# Patient Record
(demographics unavailable — no encounter records)

---

## 2025-03-12 NOTE — CARDIOLOGY SUMMARY
[de-identified] : 3/12/2025: NSR (HR 68 bpm), LAD 6/12/2024: NSR (HR 65 bpm), LAD 7/12/2023: NSR (HR 64 bpm), LAD,  msec 3/29/2023 NSR (HR 60 bpm), QTc 433 msec 9/21/2022 AFib with RVR ( bpm), QTc 419 msec 8/10/2022 AFib with RVR terminating into NSR without a conversion pause 2/2/22 NSR (HR 72 bpm) [de-identified] : 8/10-9/8/2022 28 days 7% AF burden with RVR (HR up to 233 bpm); 1% PVC burden; 10 beats NSVT [de-identified] : 4/13/2023  Pharmacologic Stress  Normal LV myocardial perfusion. No CAD. Mildly reduced global LV stress function. Reduced EF is likely due to atrial fibrillation and gating error.  [de-identified] : 2/20/2024 EF 62% Mod AS. Mild dilated ascending aorta (3.8 cm). Normal LA size. Mild MR.   HERSON 8/6/21  1. Left ventricular ejection fraction, by visual estimation, is 55 to 60%.  2. Mild mitral valve regurgitation.  3. Mild tricuspid regurgitation.  4. Sclerotic aortic valve with normal opening.  5. Color flow doppler and intravenous injection of agitated saline demonstrates the presence of an intact intra atrial septum.  6. No left atrial appendage thrombus and normal left atrial appendage velocities.  [de-identified] : 6/13/2023 Cryo AF ablation

## 2025-03-12 NOTE — HISTORY OF PRESENT ILLNESS
[FreeTextEntry1] : Cardiologist: Dr. River PCP: Dr. Wisdom  65 yo F with history of HTN, hypothyroidism, left breast ca s/p lumpectomy, admitted 8/6-8/10/21 for pneumonia found to have new onset AFib with RVR. She underwent HERSON/CV, which was unsuccessful so she was loaded with Amiodarone and started on Eliquis and discharged. Patient developed hives/rash so Eliquis was changed to Xarelto.  She underwent Cryo AFib ablation on June 13, 2023. Off of Amio since Dec 2023.   6/12/2024 She denies chest pain, dyspnea, palpitations, dizziness, lightheadedness, presyncope or syncope. Feels well.  3/12/2025 Here for routine follow up. Feels well. No cardiovascular complaints. Had asymptomatic AFib in the past.

## 2025-03-12 NOTE — DISCUSSION/SUMMARY
[FreeTextEntry1] : We had an extensive conversation regarding the nature of atrial fibrillation, including potential etiologies, underlying pathophysiology and natural history of the disease. In addition, the potential risk of thromboembolic events and assessment of that risk were discussed. I have emphasized the importance of continuing anticoagulation.   I have recommended an event monitor to further evaluate her symptoms to determine if the symptoms may be related to a cardiac arrhythmia.  I educated the patient about the patient symptom activator feature and I have asked her to activate the event monitor whenever she has symptoms.  [EKG obtained to assist in diagnosis and management of assessed problem(s)] : EKG obtained to assist in diagnosis and management of assessed problem(s)

## 2025-03-12 NOTE — ASSESSMENT
[FreeTextEntry1] : # Paroxysmal AFib on Xarelto s/p cryoballoon AF ablation 6/13/2023 - Prior to ablation, MCOT with 7% AF burden with RVR.  - Cont Xarelto 20 mg daily for CHADS VASc of at least 2, almost 3 (HTN, F). No signs/symptoms of bleeding.  - Cont Metoprolol Succinate ER 25 mg daily.  - Pulm referral provided multiple times but patient does not think she has sleep apnea.  - Event monitor to assess for AF recurrence since pt was asymptomatic prev  # NSVT - Stress test without CAD  # HTN - BP well controlled. - Cont Metoprolol Succinate ER 25 mg daily and Lisinopril 20 mg daily. - 2g Na diet enforced  I have also advised the patient to go to the nearest emergency room if she experiences any chest pain, dyspnea, syncope, or has any other compelling symptoms.  Follow up in 6 weeks

## 2025-04-03 NOTE — DATA REVIEWED
[FreeTextEntry1] : : [id number]     EXAM:  MG MAMMO SCREEN W JAKOB BI#     03/26/2025 INTERPRETATION:  HISTORY: Bilateral MG MAMMO SCREEN W JAKOB BI# was performed. Patient is 64 years old and is seen for screening.  The patient has a history of right excision in 2014 - benign and left lumpectomy in 2014 - malignant. The patient has the following family history of breast cancer:  maternal aunt, breast cancer and sister, at age 42, breast cancer.  RISK ASSESSMENT: Tyrer-Cuzick Lifetime Risk: 11.9  CLINICAL BREAST EXAM: The patient reports their last clinical breast exam was performed within the past year.  COMPARISON STUDIES: The present examination has been compared to prior imaging studies performed at [facility] on 02/17/2022, 02/23/2023 and 02/29/2024.  MAMMOGRAM FINDINGS: Mammography was performed including the following views: bilateral craniocaudal with tomosynthesis, bilateral mediolateral oblique with tomosynthesis, and bilateral nipple profile.  The examination includes digital synthetic 2D and digital tomosynthesis 3D images. Additional imaging analysis was performed using CAD (computer-aided detection) software.  There are scattered areas of fibroglandular density.  Finding 1:  There is an area of benign architectural distortion corresponding to the site of surgery seen in the left breast.  Finding 2:  There are benign-type calcifications seen in the left breast.  No suspicious mass, grouping of calcifications, or other abnormality is identified.  IMPRESSION: There is no mammographic evidence of malignancy.  RECOMMENDATION: Unless otherwise indicated by clinical findings, annual screening mammography recommended.  ASSESSMENT: BI-RADS Category 2:  Benign  The patient will be notified of these results by telephone, and will also be mailed a written summary in layman's terms.

## 2025-04-03 NOTE — HISTORY OF PRESENT ILLNESS
[FreeTextEntry1] : Problem LIst: 1. Left breast DCIS. ER/NE (+) Aide 8/6 (2014) 2. s/p Left NLOC / XOFT - 08/2014. 3. Dr Bashir - Tamoxifen since 2014 4. BRCA 1/2  negative   ASHWIN MCGOVERN is a 60 year old female patient who presents today in follow up for left breast DCIS.  INTERVAL HISTORY:  Ashwin is here for her yearly follow up for left breast DCIS (diagnosed and treated in 2014).   -s/p L IORT -s/p tamoxifen since 2014   She has been doing well with no new breast related complaints.  She denies any breast pain, has not palpated any new lesions, and denies any nipple discharge or retraction. She is continuing on tamoxifen and tolerating it well.  She denies any unilateral leg swelling, shortness of breath, abnormal vaginal bleeding or other health related complaints. She does have occasional bilateral calf pain however.   She has started TERMFIRE for psoriasis.   Her most recent imaging was a b/l screening tomosynthesis on 2/15/21 which revealed L architectural distortion at site of surgery which has remained stable, stable benign type calcifications in her left breast, deemed BIRADS 2.   03/29/2022 -- Ashwin is here for her yearly follow up visit for left breast DCIS (diagnosed and treated in 2014).   -s/p L IORT -s/p tamoxifen since 2014   She has no breast related complaints at this time.  She denies any breast pain, has not palpated any new palpable masses in either breast and denies any nipple discharge or retraction.  Her most recent imaging: B/L Screening Mammo - 02/17/2022: -There are scattered areas of fibroglandular density. -There are stable benign-type calcifications with associated benign architectural distortion corresponding to the site of surgery seen in the left breast. -There is no mammographic evidence of malignancy. BI-RADS Category 2:  Benign  03/30/2023 -- Ashwin is here for her yearly follow up visit for left breast DCIS (diagnosed and treated in 2014).   -s/p L IORT -s/p tamoxifen since 2014   She has no breast related complaints at this time.  She denies any breast pain, has not palpated any new palpable masses in either breast and denies any nipple discharge or retraction.  Her most recent imaging: B/L Screening Mammo - 02/23/2023: -There are scattered areas of fibroglandular density. -There is an area of architectural distortion at the site of lumpectomy seen in the left breast. -There is an area of benign architectural distortion corresponding to the site of surgery seen in the right breast. -There is no mammographic evidence of malignancy. BI-RADS Category 2:  Benign  03/08/2024 -- Ashwin is here for her yearly follow up visit for left breast DCIS (diagnosed and treated in 2014).   -s/p L IORT -s/p tamoxifen since 2014   She has no breast related complaints at this time.  She denies any breast pain, has not palpated any new palpable masses in either breast and denies any nipple discharge or retraction.  Her most recent imaging: B/L Screening Mammo - 02/29/2024: -There are scattered areas of fibroglandular density. -There is an area of benign architectural distortion corresponding to the site of surgery seen in the left breast. -There are benign-type calcifications seen in the left breast. -No suspicious mass, grouping of calcifications, or other abnormality is identified. -There is no mammographic evidence of malignancy. BI-RADS Category 2:  Benign   3/26/2025 b/l mammo-->birads2 scattered areas of fibroglandular density.  an area of benign architectural distortion corresponding to the site of surgery seen in the left breast.  benign-type calcifications seen in the left breast.  She has no breast related complaints at this time.  She denies any breast pain, has not palpated any new palpable masses in either breast and denies any nipple discharge or retraction.

## 2025-04-03 NOTE — HISTORY OF PRESENT ILLNESS
[FreeTextEntry1] : Problem LIst: 1. Left breast DCIS. ER/TN (+) Aide 8/6 (2014) 2. s/p Left NLOC / XOFT - 08/2014. 3. Dr Bashir - Tamoxifen since 2014 4. BRCA 1/2  negative   ASHWIN MCGOVERN is a 60 year old female patient who presents today in follow up for left breast DCIS.  INTERVAL HISTORY:  Ashwin is here for her yearly follow up for left breast DCIS (diagnosed and treated in 2014).   -s/p L IORT -s/p tamoxifen since 2014   She has been doing well with no new breast related complaints.  She denies any breast pain, has not palpated any new lesions, and denies any nipple discharge or retraction. She is continuing on tamoxifen and tolerating it well.  She denies any unilateral leg swelling, shortness of breath, abnormal vaginal bleeding or other health related complaints. She does have occasional bilateral calf pain however.   She has started TERMFIRE for psoriasis.   Her most recent imaging was a b/l screening tomosynthesis on 2/15/21 which revealed L architectural distortion at site of surgery which has remained stable, stable benign type calcifications in her left breast, deemed BIRADS 2.   03/29/2022 -- Ashwin is here for her yearly follow up visit for left breast DCIS (diagnosed and treated in 2014).   -s/p L IORT -s/p tamoxifen since 2014   She has no breast related complaints at this time.  She denies any breast pain, has not palpated any new palpable masses in either breast and denies any nipple discharge or retraction.  Her most recent imaging: B/L Screening Mammo - 02/17/2022: -There are scattered areas of fibroglandular density. -There are stable benign-type calcifications with associated benign architectural distortion corresponding to the site of surgery seen in the left breast. -There is no mammographic evidence of malignancy. BI-RADS Category 2:  Benign  03/30/2023 -- Ashwin is here for her yearly follow up visit for left breast DCIS (diagnosed and treated in 2014).   -s/p L IORT -s/p tamoxifen since 2014   She has no breast related complaints at this time.  She denies any breast pain, has not palpated any new palpable masses in either breast and denies any nipple discharge or retraction.  Her most recent imaging: B/L Screening Mammo - 02/23/2023: -There are scattered areas of fibroglandular density. -There is an area of architectural distortion at the site of lumpectomy seen in the left breast. -There is an area of benign architectural distortion corresponding to the site of surgery seen in the right breast. -There is no mammographic evidence of malignancy. BI-RADS Category 2:  Benign  03/08/2024 -- Ashwin is here for her yearly follow up visit for left breast DCIS (diagnosed and treated in 2014).   -s/p L IORT -s/p tamoxifen since 2014   She has no breast related complaints at this time.  She denies any breast pain, has not palpated any new palpable masses in either breast and denies any nipple discharge or retraction.  Her most recent imaging: B/L Screening Mammo - 02/29/2024: -There are scattered areas of fibroglandular density. -There is an area of benign architectural distortion corresponding to the site of surgery seen in the left breast. -There are benign-type calcifications seen in the left breast. -No suspicious mass, grouping of calcifications, or other abnormality is identified. -There is no mammographic evidence of malignancy. BI-RADS Category 2:  Benign   3/26/2025 b/l mammo-->birads2 scattered areas of fibroglandular density.  an area of benign architectural distortion corresponding to the site of surgery seen in the left breast.  benign-type calcifications seen in the left breast.  She has no breast related complaints at this time.  She denies any breast pain, has not palpated any new palpable masses in either breast and denies any nipple discharge or retraction.

## 2025-04-03 NOTE — ASSESSMENT
[FreeTextEntry1] : MARIE is a 64 year old patient who presented today in follow up for a history of left breast DCIS, ER/IA positive, 2014.   S/p L lumpectomy with IORT 2014 s/p tamoxifen since 2014.   On physical exam, there are no discrete masses in either breast or axilla. There is no nipple discharge or inversion bilaterally. There are no skin changes bilaterally.    3/26/2025 b/l mammo-->birads2 scattered areas of fibroglandular density.  an area of benign architectural distortion corresponding to the site of surgery seen in the left breast.  benign-type calcifications seen in the left breast.  She will be due for a bilateral screening mammogram in March 2026.  This will be scheduled for her today.   I will have her follow up in 1 year.    I spent a total of 30 minutes of face to face time with this patient, greater than 50% of which was spent in counseling and/or coordination of care. All of her questions were appropriately answered. She knows to call with any concerns.   PLAN: - B/L Screening Mammo - March 2026. - f/u after.

## 2025-04-03 NOTE — PAST MEDICAL HISTORY
[Postmenopausal] : The patient is postmenopausal [Menarche Age ____] : age at menarche was [unfilled] [Menopause Age____] : age at menopause was [unfilled] [Total Preg ___] : G[unfilled] [Live Births ___] : P[unfilled]  [FreeTextEntry6] : No. [FreeTextEntry7] : No.

## 2025-04-03 NOTE — ASSESSMENT
[FreeTextEntry1] : MARIE is a 64 year old patient who presented today in follow up for a history of left breast DCIS, ER/KS positive, 2014.   S/p L lumpectomy with IORT 2014 s/p tamoxifen since 2014.   On physical exam, there are no discrete masses in either breast or axilla. There is no nipple discharge or inversion bilaterally. There are no skin changes bilaterally.    3/26/2025 b/l mammo-->birads2 scattered areas of fibroglandular density.  an area of benign architectural distortion corresponding to the site of surgery seen in the left breast.  benign-type calcifications seen in the left breast.  She will be due for a bilateral screening mammogram in March 2026.  This will be scheduled for her today.   I will have her follow up in 1 year.    I spent a total of 30 minutes of face to face time with this patient, greater than 50% of which was spent in counseling and/or coordination of care. All of her questions were appropriately answered. She knows to call with any concerns.   PLAN: - B/L Screening Mammo - March 2026. - f/u after.

## 2025-04-03 NOTE — DATA REVIEWED
[FreeTextEntry1] : : [id number]     EXAM:  MG MAMMO SCREEN W JAKOB BI#     03/26/2025 INTERPRETATION:  HISTORY: Bilateral MG MAMMO SCREEN W JAKOB BI# was performed. Patient is 64 years old and is seen for screening.  The patient has a history of right excision in 2014 - benign and left lumpectomy in 2014 - malignant. The patient has the following family history of breast cancer:  maternal aunt, breast cancer and sister, at age 42, breast cancer.  RISK ASSESSMENT: Tyrer-Cuzick Lifetime Risk: 11.9  CLINICAL BREAST EXAM: The patient reports their last clinical breast exam was performed within the past year.  COMPARISON STUDIES: The present examination has been compared to prior imaging studies performed at [facility] on 02/17/2022, 02/23/2023 and 02/29/2024.  MAMMOGRAM FINDINGS: Mammography was performed including the following views: bilateral craniocaudal with tomosynthesis, bilateral mediolateral oblique with tomosynthesis, and bilateral nipple profile.  The examination includes digital synthetic 2D and digital tomosynthesis 3D images. Additional imaging analysis was performed using CAD (computer-aided detection) software.  There are scattered areas of fibroglandular density.  Finding 1:  There is an area of benign architectural distortion corresponding to the site of surgery seen in the left breast.  Finding 2:  There are benign-type calcifications seen in the left breast.  No suspicious mass, grouping of calcifications, or other abnormality is identified.  IMPRESSION: There is no mammographic evidence of malignancy.  RECOMMENDATION: Unless otherwise indicated by clinical findings, annual screening mammography recommended.  ASSESSMENT: BI-RADS Category 2:  Benign  The patient will be notified of these results by telephone, and will also be mailed a written summary in layman's terms.

## 2025-04-23 NOTE — ASSESSMENT
[FreeTextEntry1] : # Asymptomatic Paroxysmal AFib on Xarelto s/p cryoballoon AF ablation 6/13/2023 - Prior to ablation, MCOT with 7% AF burden with RVR.  - Recent MCOT with NO AFIB.  - Cont Xarelto 20 mg daily for CHADS VASc of at least 2, almost 3 (HTN, F). No signs/symptoms of bleeding.  - Cont Metoprolol Succinate ER 25 mg daily.  - Pulm referral provided multiple times but patient does not think she has sleep apnea.   # NSVT - Stress test without CAD  # PVC  - < 1% burden on recent monitor   # HTN - BP well controlled. - Cont Metoprolol Succinate ER 25 mg daily and Lisinopril 20 mg daily. - 2g Na diet enforced  I have also advised the patient to go to the nearest emergency room if she experiences any chest pain, dyspnea, syncope, or has any other compelling symptoms.  Follow up in 9 mo

## 2025-04-23 NOTE — HISTORY OF PRESENT ILLNESS
[FreeTextEntry1] : Cardiologist: Dr. River PCP: Dr. Wisdom  63 yo F with history of HTN, hypothyroidism, left breast ca s/p lumpectomy, admitted 8/6-8/10/21 for pneumonia found to have new onset AFib with RVR. She underwent HERSON/CV, which was unsuccessful so she was loaded with Amiodarone and started on Eliquis and discharged. Patient developed hives/rash so Eliquis was changed to Xarelto.  She underwent Cryo AFib ablation on June 13, 2023. Off of Amio since Dec 2023.   6/12/2024 She denies chest pain, dyspnea, palpitations, dizziness, lightheadedness, presyncope or syncope. Feels well.  3/12/2025 Here for routine follow up. Feels well. No cardiovascular complaints. Had asymptomatic AFib in the past.   4/23/2025 Here for routine follow up. Feels well. No cardiovascular complaints.

## 2025-04-23 NOTE — CARDIOLOGY SUMMARY
[de-identified] : 4/23/2025: NSR (HR 64 bpm), LAD 3/12/2025: NSR (HR 68 bpm), LAD 6/12/2024: NSR (HR 65 bpm), LAD 7/12/2023: NSR (HR 64 bpm), LAD,  msec 3/29/2023 NSR (HR 60 bpm), QTc 433 msec 9/21/2022 AFib with RVR ( bpm), QTc 419 msec 8/10/2022 AFib with RVR terminating into NSR without a conversion pause 2/2/22 NSR (HR 72 bpm) [de-identified] : 3/12/2025 2 weeks. AVG HR 78 bpm. < 1% PVC burden. No AFib.  8/10-9/8/2022 28 days 7% AF burden with RVR (HR up to 233 bpm); 1% PVC burden; 10 beats NSVT [de-identified] : 4/13/2023  Pharmacologic Stress  Normal LV myocardial perfusion. No CAD. Mildly reduced global LV stress function. Reduced EF is likely due to atrial fibrillation and gating error.  [de-identified] : 2/20/2024 EF 62% Mod AS. Mild dilated ascending aorta (3.8 cm). Normal LA size. Mild MR.   HERSON 8/6/21  1. Left ventricular ejection fraction, by visual estimation, is 55 to 60%.  2. Mild mitral valve regurgitation.  3. Mild tricuspid regurgitation.  4. Sclerotic aortic valve with normal opening.  5. Color flow doppler and intravenous injection of agitated saline demonstrates the presence of an intact intra atrial septum.  6. No left atrial appendage thrombus and normal left atrial appendage velocities.  [de-identified] : 6/13/2023 Cryo AF ablation